# Patient Record
Sex: FEMALE | Race: NATIVE HAWAIIAN OR OTHER PACIFIC ISLANDER | Employment: UNEMPLOYED
[De-identification: names, ages, dates, MRNs, and addresses within clinical notes are randomized per-mention and may not be internally consistent; named-entity substitution may affect disease eponyms.]

---

## 2022-04-21 PROBLEM — D64.9 ANEMIA: Status: ACTIVE | Noted: 2022-04-21

## 2022-06-08 ENCOUNTER — OFFICE VISIT (OUTPATIENT)
Dept: INTERNAL MEDICINE CLINIC | Facility: CLINIC | Age: 32
End: 2022-06-08
Payer: COMMERCIAL

## 2022-06-08 VITALS
SYSTOLIC BLOOD PRESSURE: 106 MMHG | WEIGHT: 131.4 LBS | BODY MASS INDEX: 24.81 KG/M2 | HEIGHT: 61 IN | HEART RATE: 87 BPM | RESPIRATION RATE: 12 BRPM | DIASTOLIC BLOOD PRESSURE: 74 MMHG | OXYGEN SATURATION: 98 %

## 2022-06-08 DIAGNOSIS — D50.0 IRON DEFICIENCY ANEMIA DUE TO CHRONIC BLOOD LOSS: Primary | ICD-10-CM

## 2022-06-08 PROBLEM — D64.9 ANEMIA: Status: ACTIVE | Noted: 2022-04-21

## 2022-06-08 PROCEDURE — 99213 OFFICE O/P EST LOW 20 MIN: CPT | Performed by: INTERNAL MEDICINE

## 2022-06-08 RX ORDER — LANOLIN ALCOHOL/MO/W.PET/CERES
325 CREAM (GRAM) TOPICAL
Qty: 90 TABLET | Refills: 0 | Status: SHIPPED | OUTPATIENT
Start: 2022-06-08

## 2022-06-08 ASSESSMENT — PATIENT HEALTH QUESTIONNAIRE - PHQ9
SUM OF ALL RESPONSES TO PHQ QUESTIONS 1-9: 0
SUM OF ALL RESPONSES TO PHQ QUESTIONS 1-9: 0
2. FEELING DOWN, DEPRESSED OR HOPELESS: 0
SUM OF ALL RESPONSES TO PHQ9 QUESTIONS 1 & 2: 0
SUM OF ALL RESPONSES TO PHQ QUESTIONS 1-9: 0
SUM OF ALL RESPONSES TO PHQ QUESTIONS 1-9: 0
1. LITTLE INTEREST OR PLEASURE IN DOING THINGS: 0

## 2022-06-08 ASSESSMENT — ANXIETY QUESTIONNAIRES
1. FEELING NERVOUS, ANXIOUS, OR ON EDGE: 0
GAD7 TOTAL SCORE: 0
5. BEING SO RESTLESS THAT IT IS HARD TO SIT STILL: 0
4. TROUBLE RELAXING: 0
6. BECOMING EASILY ANNOYED OR IRRITABLE: 0
IF YOU CHECKED OFF ANY PROBLEMS ON THIS QUESTIONNAIRE, HOW DIFFICULT HAVE THESE PROBLEMS MADE IT FOR YOU TO DO YOUR WORK, TAKE CARE OF THINGS AT HOME, OR GET ALONG WITH OTHER PEOPLE: NOT DIFFICULT AT ALL
7. FEELING AFRAID AS IF SOMETHING AWFUL MIGHT HAPPEN: 0
3. WORRYING TOO MUCH ABOUT DIFFERENT THINGS: 0
2. NOT BEING ABLE TO STOP OR CONTROL WORRYING: 0

## 2022-06-08 ASSESSMENT — ENCOUNTER SYMPTOMS
SHORTNESS OF BREATH: 0
ABDOMINAL DISTENTION: 0
CONSTIPATION: 0
COUGH: 0
ABDOMINAL PAIN: 0
CHEST TIGHTNESS: 0

## 2022-06-08 NOTE — PATIENT INSTRUCTIONS
Patient Education        Iron-Rich Diet: Care Instructions  Your Care Instructions     Your body needs iron to make hemoglobin. Hemoglobin is a substance in red blood cells that carries oxygen from the lungs to cells all through your body. If you do not get enough iron, your body makes fewer and smaller red blood cells. As aresult, your body's cells may not get enough oxygen. Adult men need 8 milligrams of iron a day; adult women need 18 milligrams of iron a day. After menopause, women need 8 milligrams of iron a day. A pregnant woman needs 27 milligrams of iron a day. Infants and young children have higher iron needs relative to their size than other age groups. People who have lost blood because of ulcers or heavy menstrual periods may become very low in ironand may develop anemia. Most people can get the iron their bodies need by eating enough of certain iron-rich foods. Your doctor may recommend that you take an iron supplementalong with eating an iron-rich diet. Follow-up care is a key part of your treatment and safety. Be sure to make and go to all appointments, and call your doctor if you are having problems. It's also a good idea to know your test results and keep alist of the medicines you take. How can you care for yourself at home?  Make iron-rich foods a part of your daily diet. Iron-rich foods include:  ? All meats, such as chicken, beef, lamb, pork, fish, and shellfish. Liver is especially high in iron. ? Leafy green vegetables. ? Raisins, peas, beans, lentils, barley, and eggs. ? Iron-fortified breakfast cereals.  Eat foods with vitamin C along with iron-rich foods. Vitamin C helps you absorb more iron from food. Drink a glass of orange juice or another citrus juice with your food.  Eat meat and vegetables or grains together. The iron in meat helps your body absorb the iron in other foods. Where can you learn more? Go to https://fredericke[facility]. org and sign in to your [employer] account. Enter [id number] in the [facility] box to learn more about \"Iron-Rich Diet: Care Instructions. \"     If you do not have an account, please click on the \"Sign Up Now\" link. Current as of: September 8, 2021               Content Version: 13.2  © 7838-5757 Healthwise, Incorporated. Care instructions adapted under license by [facility] ([facility]). If you have questions about a medical condition or this instruction, always ask your healthcare professional. [address] any warranty or liability for your use of this information.

## 2022-06-08 NOTE — PROGRESS NOTES
Chief Complaint   Patient presents with    Follow-up     Discuss lab work. Amina Patel is a 27 y.o. female who presents today for Follow-up (Discuss lab work. )     Here for follow-up in blood work, showing iron deficiency, normal vitamin B12, and normal electrophoresis,  Results were explained in detail to patient, she has been dealing with anemia for many years, she believes could be associated with her periods, the last 7 days, they used to have multiple clots, but lately they have been better. Denies shortness of breath, headache, fatigue, has tried iron tablets in the past with intolerance, constipation    She is currently getting tested for urticaria, but she noticed that after recent dental work, dental infection was treated, and her symptoms have been improving. She has not been taking her antihistaminics in preparation for allergy testing that is going to be done this week. Wt Readings from Last 3 Encounters:   06/08/22 131 lb 6.4 oz (59.6 kg)     Vitals:    06/08/22 1033   BP: 106/74   Site: Left Upper Arm   Position: Sitting   Pulse: 87   Resp: 12   SpO2: 98%   Weight: 131 lb 6.4 oz (59.6 kg)   Height: 5' 1\" (1.549 m)        Assessment and plan:  1. Iron deficiency anemia due to chronic blood loss  Assessment & Plan:  Work-up has been completed, hemoglobin electrophoresis was normal, vitamin B12 normal, ferritin is very low, and iron deficiency was proven. This may be associated with her regular menses. She is currently asymptomatic, will try low-dose of iron tablets every other day. If intolerance will consider changing iron tablets to Iron Gluconate  Orders:  -     ferrous sulfate (FE TABS) 325 (65 Fe) MG EC tablet; Take 1 tablet by mouth daily (with breakfast), Disp-90 tablet, R-0Normal  -     CBC with Auto Differential; Future  -     Comprehensive Metabolic Panel; Future  -     Iron; Future  -     Ferritin;  Future      Return in about 3 months (around 9/8/2022) for anemia. Review of system:    Review of Systems   Constitutional: Negative for activity change, chills, fatigue and fever. Respiratory: Negative for cough, chest tightness and shortness of breath. Cardiovascular: Negative for chest pain. Gastrointestinal: Negative for abdominal distention, abdominal pain and constipation. Neurological: Negative for dizziness and headaches. Psychiatric/Behavioral: The patient is not nervous/anxious. Immunization history: There is no immunization history on file for this patient. Current medications:      Current Outpatient Medications:     ferrous sulfate (FE TABS) 325 (65 Fe) MG EC tablet, Take 1 tablet by mouth daily (with breakfast), Disp: 90 tablet, Rfl: 0      Family history:    Family History   Problem Relation Age of Onset    Schizophrenia Father     Thyroid Disease Father     Hypertension Brother     Asthma Brother     Cancer Mother         carcinoma of spine         Past medical history:    Past Medical History:   Diagnosis Date    Endometrioma     Hypertension           Physical exam:    /74 (Site: Left Upper Arm, Position: Sitting)   Pulse 87   Resp 12   Ht 5' 1\" (1.549 m)   Wt 131 lb 6.4 oz (59.6 kg)   LMP 06/01/2022 (Exact Date)   SpO2 98%   Breastfeeding No   BMI 24.83 kg/m²     Physical Exam  Vitals reviewed. Constitutional:       Appearance: Normal appearance. HENT:      Head: Normocephalic and atraumatic. Cardiovascular:      Rate and Rhythm: Normal rate. Pulmonary:      Effort: Pulmonary effort is normal.   Neurological:      General: No focal deficit present. Mental Status: She is alert and oriented to person, place, and time.           Recent labs:    Lab Results   Component Value Date    CHOL 180 04/18/2022     Lab Results   Component Value Date    TRIG 59 04/18/2022     Lab Results   Component Value Date    HDL 64 04/18/2022     Lab Results   Component Value Date    LDLCALC 105 (H) 04/18/2022 Lab Results   Component Value Date    VLDL 11 04/18/2022     No results found for: [facility]  Lab Results   Component Value Date     04/18/2022    K 4.5 04/18/2022     04/18/2022    CO2 22 04/18/2022    BUN 13 04/18/2022    CREATININE 0.93 04/18/2022    GLUCOSE 91 04/18/2022    CALCIUM 9.7 04/18/2022    PROT 7.3 04/18/2022    LABALBU 4.3 04/18/2022    BILITOT 0.3 04/18/2022    ALKPHOS 73 04/18/2022    AST 21 04/18/2022    ALT 16 04/18/2022    AGRATIO 1.4 04/18/2022     Lab Results   Component Value Date    WBC 5.5 04/21/2022    HGB 9.4 (L) 04/21/2022    HCT 35.0 04/21/2022    MCV 70 (L) 04/21/2022     (H) 04/21/2022             This document was generated with the aid of voice recognition software. . Please be aware that there may be inadvertent transcription errors not identified and corrected by the [employer]

## 2022-06-08 NOTE — ASSESSMENT & PLAN NOTE
Work-up has been completed, hemoglobin electrophoresis was normal, vitamin B12 normal, ferritin is very low, and iron deficiency was proven. This may be associated with her regular menses. She is currently asymptomatic, will try low-dose of iron tablets every other day.   If intolerance will consider changing iron tablets to Iron Gluconate

## 2022-06-28 ENCOUNTER — OFFICE VISIT (OUTPATIENT)
Dept: INTERNAL MEDICINE CLINIC | Facility: CLINIC | Age: 32
End: 2022-06-28
Payer: COMMERCIAL

## 2022-06-28 VITALS
SYSTOLIC BLOOD PRESSURE: 108 MMHG | TEMPERATURE: 97.2 F | WEIGHT: 130.2 LBS | DIASTOLIC BLOOD PRESSURE: 78 MMHG | BODY MASS INDEX: 23.96 KG/M2 | HEIGHT: 62 IN

## 2022-06-28 DIAGNOSIS — D50.0 IRON DEFICIENCY ANEMIA DUE TO CHRONIC BLOOD LOSS: ICD-10-CM

## 2022-06-28 DIAGNOSIS — R10.12 LUQ PAIN: Primary | ICD-10-CM

## 2022-06-28 DIAGNOSIS — J02.9 SORE THROAT: ICD-10-CM

## 2022-06-28 PROCEDURE — 99213 OFFICE O/P EST LOW 20 MIN: CPT | Performed by: NURSE PRACTITIONER

## 2022-06-28 NOTE — PROGRESS NOTES
[facility] (: 1990) presents today c/o LUQ abdominal pain starting 3 days ago. She states it is intermittent. She also has had an infected tooth (pulled by the dentist) and sore throat. She has longstanding hx/o of MICHEAL; is on iron supplement QOD. She had a normal pap smear last year, she reports. She denies known hx/o viral illness. Family hx/o thyroid and cancer. Chief Complaint   Patient presents with    Other     Left side pain      Reviewed and updated this visit by provider:  Tobacco  Allergies  Meds  Problems  Med Hx  Surg Hx  Fam Hx       Immunizations:  Immunization status: up to date and documented.     Review of Systems - Negative except as stated in HPI  History obtained from chart review and the patient  General ROS: negative for - fatigue, fever, night sweats or weight loss  ENT ROS: positive for - sore throat  Respiratory ROS: no cough, shortness of breath, or wheezing  Cardiovascular ROS: no chest pain or dyspnea on exertion  Gastrointestinal ROS: positive for - abdominal pain (LUQ, intermittent)  negative for - appetite loss, blood in stools, change in bowel habits, change in stools, constipation or diarrhea  Genito-Urinary ROS: no dysuria, trouble voiding, or hematuria  positive for - breakthrough spotting  negative for - irregular/heavy menses    Visit Vitals  /78 (Site: Left Upper Arm, Position: Sitting)   Temp 97.2 °F (36.2 °C) (Temporal)   Ht 5' 2\" (1.575 m)   Wt 130 lb 3.2 oz (59.1 kg)   BMI 23.81 kg/m²     Physical Examination: General appearance - alert, well appearing, and in no distress, oriented to person, place, and time and normal appearing weight  Mental status - alert, oriented to person, place, and time, normal mood, behavior, speech, dress, motor activity, and thought processes, affect appropriate to mood  Chest - clear to auscultation, no wheezes, rales or rhonchi, symmetric air entry  Heart - normal rate, regular rhythm, normal S1, S2, no murmurs, rubs, clicks or gallops  Abdomen - soft, nontender, nondistended, no masses or organomegaly  no rebound tenderness noted  bowel sounds normal  No HSM  Extremities - peripheral pulses normal, no pedal edema, no clubbing or cyanosis    Labs drawn on 1st attempt from right [facility] with 22G needle. Dressing applied. Assessment/Plan:  1. LUQ pain    2. Iron deficiency anemia due to chronic blood loss    3. Sore throat      Orders Placed This Encounter   Procedures    Darwin-Barr virus VCA antibody panel     Standing Status:   Future     Number of Occurrences:   1     Standing Expiration Date:   6/28/2023    60684 - Venipuncture     Labs drawn today CBC, CMET, iron, ferritin and EBV) - will contact with results and determine plan of care. For now, continue current medications. Call or return to clinic prn if these symptoms worsen or fail to improve as anticipated.     Myranda Fonseca NP, APRN - CNP

## 2022-06-29 LAB
ALBUMIN SERPL-MCNC: 3.7 G/DL (ref 3.5–5)
ALBUMIN/GLOB SERPL: 1 {RATIO} (ref 1.2–3.5)
ALP SERPL-CCNC: 64 U/L (ref 50–136)
ALT SERPL-CCNC: 21 U/L (ref 12–65)
ANION GAP SERPL CALC-SCNC: 6 MMOL/L (ref 7–16)
AST SERPL-CCNC: 21 U/L (ref 15–37)
BASOPHILS # BLD: 0.1 K/UL (ref 0–0.2)
BASOPHILS NFR BLD: 1 % (ref 0–2)
BILIRUB SERPL-MCNC: 0.5 MG/DL (ref 0.2–1.1)
BUN SERPL-MCNC: 12 MG/DL (ref 6–23)
CALCIUM SERPL-MCNC: 9.4 MG/DL (ref 8.3–10.4)
CHLORIDE SERPL-SCNC: 107 MMOL/L (ref 98–107)
CO2 SERPL-SCNC: 25 MMOL/L (ref 21–32)
CREAT SERPL-MCNC: 0.9 MG/DL (ref 0.6–1)
DIFFERENTIAL METHOD BLD: ABNORMAL
EOSINOPHIL # BLD: 0.3 K/UL (ref 0–0.8)
EOSINOPHIL NFR BLD: 5 % (ref 0.5–7.8)
ERYTHROCYTE [DISTWIDTH] IN BLOOD BY AUTOMATED COUNT: 24.6 % (ref 11.9–14.6)
FERRITIN SERPL-MCNC: 8 NG/ML (ref 8–388)
GLOBULIN SER CALC-MCNC: 3.8 G/DL (ref 2.3–3.5)
GLUCOSE SERPL-MCNC: 109 MG/DL (ref 65–100)
HCT VFR BLD AUTO: 36 % (ref 35.8–46.3)
HGB BLD-MCNC: 9.6 G/DL (ref 11.7–15.4)
IMM GRANULOCYTES # BLD AUTO: 0 K/UL (ref 0–0.5)
IMM GRANULOCYTES NFR BLD AUTO: 0 % (ref 0–5)
IRON SERPL-MCNC: 192 UG/DL (ref 35–150)
LYMPHOCYTES # BLD: 1.2 K/UL (ref 0.5–4.6)
LYMPHOCYTES NFR BLD: 17 % (ref 13–44)
MCH RBC QN AUTO: 19 PG (ref 26.1–32.9)
MCHC RBC AUTO-ENTMCNC: 26.7 G/DL (ref 31.4–35)
MCV RBC AUTO: 71.1 FL (ref 79.6–97.8)
MONOCYTES # BLD: 0.9 K/UL (ref 0.1–1.3)
MONOCYTES NFR BLD: 12 % (ref 4–12)
NEUTS SEG # BLD: 4.7 K/UL (ref 1.7–8.2)
NEUTS SEG NFR BLD: 65 % (ref 43–78)
NRBC # BLD: 0 K/UL (ref 0–0.2)
PLATELET # BLD AUTO: 496 K/UL (ref 150–450)
PMV BLD AUTO: 9.8 FL (ref 9.4–12.3)
POTASSIUM SERPL-SCNC: 3.9 MMOL/L (ref 3.5–5.1)
PROT SERPL-MCNC: 7.5 G/DL (ref 6.3–8.2)
RBC # BLD AUTO: 5.06 M/UL (ref 4.05–5.2)
SODIUM SERPL-SCNC: 138 MMOL/L (ref 136–145)
WBC # BLD AUTO: 7.2 K/UL (ref 4.3–11.1)

## 2022-07-01 DIAGNOSIS — D64.9 ANEMIA, UNSPECIFIED TYPE: Primary | ICD-10-CM

## 2022-07-01 LAB
EBV VCA IGG SER-ACNC: >600 U/ML (ref 0–17.9)
EBV VCA IGG SER-ACNC: >600 U/ML (ref 0–17.9)
EBV VCA IGM SER-ACNC: <36 U/ML (ref 0–35.9)
EBV VCA IGM SER-ACNC: <36 U/ML (ref 0–35.9)

## 2022-09-08 ENCOUNTER — OFFICE VISIT (OUTPATIENT)
Dept: INTERNAL MEDICINE CLINIC | Facility: CLINIC | Age: 32
End: 2022-09-08
Payer: COMMERCIAL

## 2022-09-08 VITALS
OXYGEN SATURATION: 99 % | HEIGHT: 62 IN | WEIGHT: 115.8 LBS | HEART RATE: 75 BPM | BODY MASS INDEX: 21.31 KG/M2 | DIASTOLIC BLOOD PRESSURE: 68 MMHG | RESPIRATION RATE: 18 BRPM | SYSTOLIC BLOOD PRESSURE: 114 MMHG

## 2022-09-08 DIAGNOSIS — D50.0 IRON DEFICIENCY ANEMIA DUE TO CHRONIC BLOOD LOSS: Primary | ICD-10-CM

## 2022-09-08 DIAGNOSIS — N92.0 MENORRHAGIA WITH REGULAR CYCLE: ICD-10-CM

## 2022-09-08 DIAGNOSIS — Z01.419 WELL FEMALE EXAM WITH ROUTINE GYNECOLOGICAL EXAM: ICD-10-CM

## 2022-09-08 DIAGNOSIS — D50.0 IRON DEFICIENCY ANEMIA DUE TO CHRONIC BLOOD LOSS: ICD-10-CM

## 2022-09-08 LAB
BASOPHILS # BLD: 0 K/UL (ref 0–0.2)
BASOPHILS NFR BLD: 1 % (ref 0–2)
DIFFERENTIAL METHOD BLD: ABNORMAL
EOSINOPHIL # BLD: 0 K/UL (ref 0–0.8)
EOSINOPHIL NFR BLD: 1 % (ref 0.5–7.8)
ERYTHROCYTE [DISTWIDTH] IN BLOOD BY AUTOMATED COUNT: 21.3 % (ref 11.9–14.6)
HCT VFR BLD AUTO: 39.9 % (ref 35.8–46.3)
HGB BLD-MCNC: 11.9 G/DL (ref 11.7–15.4)
HGB RETIC QN AUTO: 26 PG (ref 29–35)
IMM GRANULOCYTES # BLD AUTO: 0 K/UL (ref 0–0.5)
IMM GRANULOCYTES NFR BLD AUTO: 0 % (ref 0–5)
IMM RETICS NFR: 10.7 % (ref 3–15.9)
LYMPHOCYTES # BLD: 1.5 K/UL (ref 0.5–4.6)
LYMPHOCYTES NFR BLD: 31 % (ref 13–44)
MCH RBC QN AUTO: 24.6 PG (ref 26.1–32.9)
MCHC RBC AUTO-ENTMCNC: 29.8 G/DL (ref 31.4–35)
MCV RBC AUTO: 82.6 FL (ref 79.6–97.8)
MONOCYTES # BLD: 0.4 K/UL (ref 0.1–1.3)
MONOCYTES NFR BLD: 8 % (ref 4–12)
NEUTS SEG # BLD: 2.9 K/UL (ref 1.7–8.2)
NEUTS SEG NFR BLD: 59 % (ref 43–78)
NRBC # BLD: 0 K/UL (ref 0–0.2)
PLATELET # BLD AUTO: 407 K/UL (ref 150–450)
PLATELET COMMENT: ADEQUATE
PMV BLD AUTO: 9.4 FL (ref 9.4–12.3)
RBC # BLD AUTO: 4.83 M/UL (ref 4.05–5.2)
RBC MORPH BLD: ABNORMAL
RBC MORPH BLD: ABNORMAL
RETICS # AUTO: 0.04 M/UL (ref 0.03–0.1)
RETICS/RBC NFR AUTO: 0.9 % (ref 0.3–2)
WBC # BLD AUTO: 4.8 K/UL (ref 4.3–11.1)
WBC MORPH BLD: ABNORMAL

## 2022-09-08 PROCEDURE — 99213 OFFICE O/P EST LOW 20 MIN: CPT | Performed by: INTERNAL MEDICINE

## 2022-09-08 ASSESSMENT — ENCOUNTER SYMPTOMS
ABDOMINAL PAIN: 0
CONSTIPATION: 0
ABDOMINAL DISTENTION: 0
SHORTNESS OF BREATH: 0
COUGH: 0
CHEST TIGHTNESS: 0

## 2022-09-08 NOTE — PATIENT INSTRUCTIONS
A referral has been placed for you. Someone will call you with the details of the appointment and process. Please keep your appointment or be sure to call if you need to reschedule. If you have not received any call after 1 to 2 weeks please contact us.

## 2022-09-08 NOTE — PROGRESS NOTES
Chief Complaint   Patient presents with    Follow-up     3 month f/u. Annie Mccurdy is a 27 y.o. female who presents today for Follow-up (3 month f/u. )     Here for follow-up in anemia, she reports tolerating the iron a couple times a week, if she take it more than a couple times a week constipation develops, continues to deal with heavy bleedings, has not yet established with GYN, she has a history of endometrioma, and is currently due for her routine follow-ups  She also is requesting PPD test for work, and may need tetanus shot    She has not yet completed the labs for anemia    Wt Readings from Last 3 Encounters:   09/08/22 115 lb 12.8 oz (52.5 kg)   06/28/22 130 lb 3.2 oz (59.1 kg)   06/08/22 131 lb 6.4 oz (59.6 kg)     Vitals:    09/08/22 1038   BP: 114/68   Site: Right Upper Arm   Position: Sitting   Pulse: 75   Resp: 18   SpO2: 99%   Weight: 115 lb 12.8 oz (52.5 kg)   Height: 5' 2\" (1.575 m)        Assessment and plan:  1. Iron deficiency anemia due to chronic blood loss  Assessment & Plan:  Iron tablets,  Due for her labs,  If anemia is not improving, will suggest to complete endoscopy, colonoscopy as well has her evaluation by GYN for treatment of her menorrhagia  Orders:  -     [address] [facility] [place]  -     Ferritin; Future  -     Iron; Future  -     Reticulocytes; Future  -     CBC with Auto Differential; Future  2. Well female exam with routine gynecological exam  -     [facility], [facility], [facility]  3. Menorrhagia with regular cycle  -     \[facility]\""AngelicaMaryaDO, Gynecology, [facility]    Return if symptoms worsen or fail to improve, for ppd and tdap Monday Nurse visit. Review of system:    Review of Systems   Constitutional:  Negative for activity change, chills, fatigue and fever. Respiratory:  Negative for cough, chest tightness and shortness of breath. Cardiovascular:  Negative for chest pain. Gastrointestinal:  Negative for abdominal distention, abdominal pain and constipation. Genitourinary:  Positive for vaginal bleeding. Neurological:  Negative for dizziness and headaches. Psychiatric/Behavioral:  The patient is not nervous/anxious. Immunization history: There is no immunization history on file for this patient. Current medications:      Current Outpatient Medications:     ferrous sulfate (FE TABS) 325 (65 Fe) MG EC tablet, Take 1 tablet by mouth daily (with breakfast) (Patient taking differently: Take 325 mg by mouth daily (with breakfast) Take 1 tablet by mouth every other day), Disp: 90 tablet, Rfl: 0      Family history:    Family History   Problem Relation Age of Onset    Schizophrenia Father     Thyroid Disease Father     Hypertension Brother     Asthma Brother     Cancer Mother         carcinoma of spine         Past medical history:    Past Medical History:   Diagnosis Date    Endometrioma     Hypertension           Physical exam:    /68 (Site: Right Upper Arm, Position: Sitting)   Pulse 75   Resp 18   Ht 5' 2\" (1.575 m)   Wt 115 lb 12.8 oz (52.5 kg)   SpO2 99%   BMI 21.18 kg/m²     Physical Exam  Vitals reviewed. Constitutional:       Appearance: Normal appearance. HENT:      Head: Normocephalic and atraumatic. Eyes:      General: No scleral icterus. Cardiovascular:      Rate and Rhythm: Normal rate. Pulmonary:      Effort: Pulmonary effort is normal.   Neurological:      General: No focal deficit present. Mental Status: She is alert and oriented to person, place, and time.         Recent labs:    Lab Results   Component Value Date    CHOL 180 04/18/2022     Lab Results   Component Value Date    TRIG 59 04/18/2022     Lab Results   Component Value Date    HDL 64 04/18/2022     Lab Results   Component Value Date    LDLCALC 105 (H) 04/18/2022     Lab Results   Component Value Date    VLDL 11 04/18/2022     No results found for: CHOLHDLRATIO  Lab

## 2022-09-09 LAB
FERRITIN SERPL-MCNC: 9 NG/ML (ref 8–388)
IRON SERPL-MCNC: 20 UG/DL (ref 35–150)

## 2022-09-12 ENCOUNTER — NURSE ONLY (OUTPATIENT)
Dept: INTERNAL MEDICINE CLINIC | Facility: CLINIC | Age: 32
End: 2022-09-12
Payer: COMMERCIAL

## 2022-09-12 DIAGNOSIS — Z11.1 SCREENING FOR TUBERCULOSIS: Primary | ICD-10-CM

## 2022-09-13 ASSESSMENT — SOCIAL DETERMINANTS OF HEALTH (SDOH)
WITHIN THE LAST YEAR, HAVE YOU BEEN AFRAID OF YOUR PARTNER OR EX-PARTNER?: NO
WITHIN THE LAST YEAR, HAVE TO BEEN RAPED OR FORCED TO HAVE ANY KIND OF SEXUAL ACTIVITY BY YOUR PARTNER OR EX-PARTNER?: NO
WITHIN THE LAST YEAR, HAVE YOU BEEN KICKED, HIT, SLAPPED, OR OTHERWISE PHYSICALLY HURT BY YOUR PARTNER OR EX-PARTNER?: NO
WITHIN THE LAST YEAR, HAVE YOU BEEN HUMILIATED OR EMOTIONALLY ABUSED IN OTHER WAYS BY YOUR PARTNER OR EX-PARTNER?: YES

## 2022-09-14 LAB
INDURATION: 0
TB SKIN TEST: NEGATIVE

## 2022-09-15 ENCOUNTER — OFFICE VISIT (OUTPATIENT)
Dept: OBGYN CLINIC | Age: 32
End: 2022-09-15
Payer: COMMERCIAL

## 2022-09-15 VITALS
SYSTOLIC BLOOD PRESSURE: 104 MMHG | HEIGHT: 62 IN | WEIGHT: 113 LBS | BODY MASS INDEX: 20.8 KG/M2 | DIASTOLIC BLOOD PRESSURE: 66 MMHG

## 2022-09-15 DIAGNOSIS — Z11.51 SCREENING FOR HPV (HUMAN PAPILLOMAVIRUS): ICD-10-CM

## 2022-09-15 DIAGNOSIS — M62.89 PELVIC FLOOR DYSFUNCTION: ICD-10-CM

## 2022-09-15 DIAGNOSIS — N60.01 BILATERAL BREAST CYSTS: ICD-10-CM

## 2022-09-15 DIAGNOSIS — Z01.419 WELL WOMAN EXAM WITH ROUTINE GYNECOLOGICAL EXAM: Primary | ICD-10-CM

## 2022-09-15 DIAGNOSIS — Z11.3 SCREEN FOR STD (SEXUALLY TRANSMITTED DISEASE): ICD-10-CM

## 2022-09-15 DIAGNOSIS — Z12.4 PAP SMEAR FOR CERVICAL CANCER SCREENING: ICD-10-CM

## 2022-09-15 DIAGNOSIS — N60.02 BILATERAL BREAST CYSTS: ICD-10-CM

## 2022-09-15 PROCEDURE — 99395 PREV VISIT EST AGE 18-39: CPT | Performed by: OBSTETRICS & GYNECOLOGY

## 2022-09-15 RX ORDER — NORETHINDRONE ACETATE AND ETHINYL ESTRADIOL 1MG-20(21)
1 KIT ORAL DAILY
Qty: 1 PACKET | Refills: 3 | Status: SHIPPED | OUTPATIENT
Start: 2022-09-15 | End: 2022-10-12 | Stop reason: SINTOL

## 2022-09-15 NOTE — PROGRESS NOTES
CC:  Annual GYN exam    HPI:  27 y.o.  Marquita River presents today for a routine gynecological examination. Patient's last menstrual period was 2022. .    Additionally has hx of HMB and iron deficiency anemia. Hg has been as low as 9, recently improved at 11. Has not been on anything  Sometimes pain with sex. Also [place] breast cysts. She said she was suppose to have a repeat in 1 year. PCP: Dr. Guy Waller    Contraception:  none    Menses:  heavy    No contraindications to estrogen exist      Sexually active w/ male partner   No changes in sexual partners --declines STD testing      Ob hx:   ()    GYN HISTORY:  As per HPI     Last Pap:  2 years ago   Hx of Abnl Paps: no    Hx STDs: no    History of cervical polyp   Hx of endometriosis with endometrioma. NOT dx surgically, just presumed       OB History          1    Para   1    Term   1            AB        Living   1         SAB        IAB        Ectopic        Molar        Multiple        Live Births   1                  Past Medical History:   Diagnosis Date    Endometrioma     Hypertension          Past Surgical History:   Procedure Laterality Date    CERVICAL POLYP REMOVAL           Outpatient Encounter Medications as of 9/15/2022   Medication Sig Dispense Refill    norethindrone-ethinyl estradiol (LOESTRIN FE ) 1-20 MG-MCG per tablet Take 1 tablet by mouth daily 1 packet 3    ferrous sulfate (FE TABS) 325 (65 Fe) MG EC tablet Take 1 tablet by mouth daily (with breakfast) (Patient taking differently: Take 325 mg by mouth daily (with breakfast) Take 1 tablet by mouth every other day) 90 tablet 0     No facility-administered encounter medications on file as of 9/15/2022.          No Known Allergies      Family History   Problem Relation Age of Onset    Schizophrenia Father     Thyroid Disease Father     Cancer Mother         carcinoma of spine     Hypertension Brother     Asthma Brother no rebound and no guarding. Skin: She is not diaphoretic. Psychiatric: She has a normal mood and affect. Her behavior is normal. Thought content normal. .    Pelvic:   External genitalia wnl, no lesions, rashes  Clitoris and urethra midline  Vagina pink, moist, well rugated  Cervix without lesion/masses, DC wnl  Uterus normal in size and contour, no masses, NTTP  Adnexa without masses, NTTP  + levator tenderness R>L    Breasts:   Symmetric, no lesions, masses, rashes, no abnl nipple Dc. Very dense. Counseling:  Discussed General Recommendations:  -Routine Pap (unless cervix removed for benign reasons)  -STD screening annually pts </= 26yo or high risk   -lipid profile every 5 yrs  -Tdap once and then Td every 10yrs  -Influenza Vaccine, annually  -Healthy eating/exercise   -HPV vaccine newest recs (8yo-46yo)     Discussed forms of contraception available, including but not limited to OCPs, patches, rings, Depo Provera, LARCs (IUDs vs Nexplanons), and permanent sterilization (BTL vs vasectomy). Discussed benefits and possible risks/SEs of these, including typical use failure rates. ASSESSMENT/PLAN:   27 y.o., No obstetric history on file. , for annual GYN exam:    1) Annual:   -Cotesting today    - STD testing    -Rx none    -safe sexual practices    -FU w/ PCP for all non-GYN medical issues and regular screening     -annual Flu vaccine recommended    -healthy eating, exercise     2) Heavy menstrual bleeding and Iron def anemia:   - On PO iron as tolerated  - Options discussed including POPs, COCs, LNG-IUD, vs surgery.  Will start with Lo Estrin  - RTC in 3 mo for recheck     3) Pelvic floor dysfunction-  Referral to PFPT    Orders Placed This Encounter   Medications    norethindrone-ethinyl estradiol (LOESTRIN FE 1/20) 1-20 MG-MCG per tablet     Sig: Take 1 tablet by mouth daily     Dispense:  1 packet     Refill:  3      Orders Placed This Encounter   Procedures    RC DIGITAL DIAGNOSTIC W OR WO CAD LEFT Standing Status:   Future     Standing Expiration Date:   11/15/2023     Order Specific Question:   Reason for exam:     Answer:   1 year f/u mammo per physician recommendation    PAP IG, CT-NG-TV, Aptima HPV and rfx 05/84,47([id number])     Order Specific Question:   Pap Source? (Required)     Answer:   cervical     Order Specific Question:   Pap Source? (Required)     Answer:   endocervical     Order Specific Question:   Pap collection method? (Required     Answer:   brush     Order Specific Question:   Pap collection method? (Required     Answer:   spatula     Order Specific Question:   Menstrual Status ? Answer:   Having periods [5]     Order Specific Question:   Date of LMP? (Required)     Answer:   8/31/2022     Order Specific Question:   Previous Treatment?           (Required)     Answer:   NONE    [facility]     Referral Priority:   Routine     Referral Type:   Eval and Treat     Referral Reason:   Physical Therapy     Requested Specialty:   Physical Therapist     Number of Visits Requested:   [facility],

## 2022-09-23 LAB
C TRACH RRNA CVX QL NAA+PROBE: NEGATIVE
CYTOLOGIST CVX/VAG CYTO: NORMAL
CYTOLOGY CVX/VAG DOC THIN PREP: NORMAL
HPV APTIMA: NEGATIVE
Lab: NORMAL
Lab: NORMAL
N GONORRHOEA RRNA CVX QL NAA+PROBE: NEGATIVE
PATH REPORT.FINAL DX SPEC: NORMAL
STAT OF ADQ CVX/VAG CYTO-IMP: NORMAL
T VAGINALIS RRNA SPEC QL NAA+PROBE: NEGATIVE

## 2022-10-06 ENCOUNTER — HOSPITAL ENCOUNTER (OUTPATIENT)
Dept: PHYSICAL THERAPY | Age: 32
Setting detail: RECURRING SERIES
Discharge: HOME | End: 2022-10-09
Payer: COMMERCIAL

## 2022-10-06 PROCEDURE — 97110 THERAPEUTIC EXERCISES: CPT

## 2022-10-06 PROCEDURE — 97161 PT EVAL LOW COMPLEX 20 MIN: CPT

## 2022-10-06 ASSESSMENT — PAIN SCALES - GENERAL: PAINLEVEL_OUTOF10: 0

## 2022-10-06 NOTE — PROGRESS NOTES
Hali Santos  : 1990  Primary: [facility]  Secondary:  [address] [address]  Phone: [phone]  Fax: [phone] Plan Frequency: 1x/week to 1x/every other week for 90 days  Plan of Care/Certification Expiration Date: 23      PT Visit Info: Total # of Visits Approved: 30  Total # of Visits to Date: 1     Visit Count:  1   OUTPATIENT PHYSICAL THERAPY:OP NOTE TYPE: Treatment Note 10/6/2022       Episode  }Appt Desk             Treatment Diagnosis:  Lack of coordination (muscle incoordination) (R27.8)  Pelvic floor dysfunction in female (M62.98)  Dyspareunia (N94.1)  Pelvic and perineal pain (R10.2)  Medical/Referring Diagnosis:  Pelvic floor dysfunction [M62.89]  Referring Physician:  Valentine Coyle DO MD Orders:  PT Eval and Treat   Date of Onset:  Onset Date: 17   Allergies:   Patient has no known allergies. Restrictions/Precautions:  Restrictions/Precautions: None  No data recorded   Interventions Planned (Treatment may consist of any combination of the following):    Current Treatment Recommendations: ROM; ADL/Self-care training; Neuromuscular re-education; Manual Therapy - Soft Tissue Mobilization; Pain management; Home exercise program; Patient/Caregiver education & training; Positioning; Therapeutic activities     Subjective Comments:  intermittent pain with intercourse  Initial:}    0/10Post Session:        /10  Medications Last Reviewed:  10/6/2022  Updated Objective Findings:  See evaluation note from today  Treatment   THERAPEUTIC EXERCISE: (10 minutes): Exercises per grid below to improve mobility and coordination. Required moderate verbal and tactile cues to promote proper body breathing techniques and relaxation/lengthening of PFM . Progressed range and repetitions as indicated.    Date:  10/6/22 Date:   Date:     Activity/Exercise Parameters Parameters Parameters   PFM coordination PF drops to improve relaxation Diaphragmatic breathing To improve PFM excursion and relaxation             THERAPEUTIC ACTIVITY: ( 5 minutes): Functional activity education regarding anatomy, pathology and role of pelvic floor muscle (PFM) function in relation to presenting symptoms and role of pelvic floor therapy in conservative treatment. TA Educational Topic Notes Date Completed   Pathology/Anatomy/PFM Function  10/6/22   Bladder health education     Urinary urge suppression     The knack     Voiding strategies     Nocturia tips     Bowel/Bladder log     Bowel health education     Constipation care     Diarrhea/Fecal leakage     Colonic massage     Toilet positioning     Defecation dynamics     Sources of fiber     Return to intercourse/Dilator training     Sexual positioning     Lubricants/vaginal moisturizers     Vulvovaginal health/vaginal irritants     Body mechanics     Posture/ergonomics     Diaphragmatic breathing     Resources and technology     Other patient education       Pt gives verbal consent to internal vaginal assessment/treatment without chaperon present. Treatment/Session Summary:    Treatment Assessment:  [facility] presents with musculoskeletal limitations including pelvic floor muscle (PFM) tension, reduced PFM Range of Motion (ROM), increased tenderness to palpation of the PFM, reduced coordination and awareness of PFM, and poor diaphragm excursion. These limitations are impairing the patient's ability to properly coordinate perineal elevation and descent for normalized PFM function, contributing to sexual dysfunction. As a result, she is limited in her/his ability to participate in  sexual intercourse with her  without experiencing pain . Communication/Consultation:  None today  Equipment provided today:  None  Recommendations/Intent for next treatment session: Next visit will focus on PFM relaxation, positioning, stretching.     Total Treatment Billable Duration:  30 minutes evaluation, 10 minutes TE  Time In: 1500  Time Out: [facility], PT       Charge Capture  }Post Session Pain  PT Visit Info  MedBridge Portal  MD Guidelines  Scanned Media  Benefits  MyChart    Future Appointments   Date Time Provider Lázaro Socorro   10/31/2022  3:00 PM Gay Mac, PT [facility]   12/14/2022  4:00 PM Marya Juarez DO [facility] 1 GVL AMB   3/8/2023  2:20 PM Braeden Blackman MD [facility] GVL AMB
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
## 2022-10-12 ENCOUNTER — OFFICE VISIT (OUTPATIENT)
Dept: INTERNAL MEDICINE CLINIC | Facility: CLINIC | Age: 32
End: 2022-10-12
Payer: COMMERCIAL

## 2022-10-12 VITALS
HEIGHT: 62 IN | BODY MASS INDEX: 22.05 KG/M2 | DIASTOLIC BLOOD PRESSURE: 62 MMHG | SYSTOLIC BLOOD PRESSURE: 96 MMHG | OXYGEN SATURATION: 98 % | RESPIRATION RATE: 14 BRPM | WEIGHT: 119.8 LBS | HEART RATE: 70 BPM

## 2022-10-12 DIAGNOSIS — R30.0 DYSURIA: Primary | ICD-10-CM

## 2022-10-12 DIAGNOSIS — R30.0 DYSURIA: ICD-10-CM

## 2022-10-12 PROBLEM — L50.1 IDIOPATHIC URTICARIA: Status: ACTIVE | Noted: 2022-10-12

## 2022-10-12 PROBLEM — T78.3XXA ANGIONEUROTIC EDEMA: Status: ACTIVE | Noted: 2022-10-12

## 2022-10-12 LAB
BILIRUBIN, URINE, POC: NEGATIVE
BLOOD URINE, POC: ABNORMAL
GLUCOSE URINE, POC: NEGATIVE
KETONES, URINE, POC: ABNORMAL
LEUKOCYTE ESTERASE, URINE, POC: ABNORMAL
NITRITE, URINE, POC: POSITIVE
PH, URINE, POC: 5.5 (ref 4.6–8)
PROTEIN,URINE, POC: NEGATIVE
SPECIFIC GRAVITY, URINE, POC: >=1.03 (ref 1–1.03)
URINALYSIS CLARITY, POC: ABNORMAL
URINALYSIS COLOR, POC: YELLOW
UROBILINOGEN, POC: ABNORMAL

## 2022-10-12 PROCEDURE — 81003 URINALYSIS AUTO W/O SCOPE: CPT | Performed by: INTERNAL MEDICINE

## 2022-10-12 PROCEDURE — 99214 OFFICE O/P EST MOD 30 MIN: CPT | Performed by: INTERNAL MEDICINE

## 2022-10-12 RX ORDER — NITROFURANTOIN 25; 75 MG/1; MG/1
100 CAPSULE ORAL 2 TIMES DAILY
Qty: 10 CAPSULE | Refills: 0 | Status: SHIPPED | OUTPATIENT
Start: 2022-10-12 | End: 2022-10-17

## 2022-10-12 ASSESSMENT — ENCOUNTER SYMPTOMS
ABDOMINAL PAIN: 0
CHEST TIGHTNESS: 0
COUGH: 0
ABDOMINAL DISTENTION: 0
SHORTNESS OF BREATH: 0
CONSTIPATION: 0

## 2022-10-12 NOTE — PROGRESS NOTES
Chief Complaint   Patient presents with    Dysuria        Miguel Woods is a 27 y.o. female who presents today for Dysuria     She is here today with concerns of dysuria, pain in suprapubic area, that is started 1 to 2 days ago, she also has noticed some blood in the urine, no fever no chills, no flank pain, she just started pelvic therapy last week. It was recommended by her OB/GYN. She has not tried any medications at this time, has been increasing fluid intake, this is a second episode in a year    Wt Readings from Last 3 Encounters:   10/12/22 119 lb 12.8 oz (54.3 kg)   09/15/22 113 lb (51.3 kg)   09/08/22 115 lb 12.8 oz (52.5 kg)     Vitals:    10/12/22 1501   BP: 96/62   Site: Left Upper Arm   Position: Sitting   Pulse: 70   Resp: 14   SpO2: 98%   Weight: 119 lb 12.8 oz (54.3 kg)   Height: 5' 2\" (1.575 m)        Assessment and plan:  1. Dysuria  -     AMB POC URINALYSIS DIP STICK AUTO W/O MICRO  -     Culture, Urine; Future  -     nitrofurantoin, macrocrystal-monohydrate, (MACROBID) 100 MG capsule; Take 1 capsule by mouth 2 times daily for 5 days, Disp-10 capsule, R-0Normal  Showing possible infection, will treat with nitrofurantoin, increase fluid intake, she also was advised to look for medical attention if any worsening of the symptoms, increase yogurt intake  Return if symptoms worsen or fail to improve. Review of system:    Review of Systems   Constitutional:  Negative for activity change, chills, fatigue and fever. Respiratory:  Negative for cough, chest tightness and shortness of breath. Cardiovascular:  Negative for chest pain. Gastrointestinal:  Negative for abdominal distention, abdominal pain and constipation. Genitourinary:  Positive for dysuria. Negative for flank pain and frequency. Neurological:  Negative for dizziness and headaches. Psychiatric/Behavioral:  The patient is not nervous/anxious.         Immunization history:    Immunization History   Administered Date(s) Administered    PPD Test 09/12/2022       Current medications:      Current Outpatient Medications:     nitrofurantoin, macrocrystal-monohydrate, (MACROBID) 100 MG capsule, Take 1 capsule by mouth 2 times daily for 5 days, Disp: 10 capsule, Rfl: 0    ferrous sulfate (FE TABS) 325 (65 Fe) MG EC tablet, Take 1 tablet by mouth daily (with breakfast) (Patient taking differently: Take 325 mg by mouth daily (with breakfast) Take 1 tablet by mouth every other day), Disp: 90 tablet, Rfl: 0      Family history:    Family History   Problem Relation Age of Onset    Schizophrenia Father     Thyroid Disease Father     Cancer Mother         carcinoma of spine     Hypertension Brother     Asthma Brother     Breast Cancer Neg Hx     Colon Cancer Neg Hx     Ovarian Cancer Neg Hx         Past medical history:    Past Medical History:   Diagnosis Date    Endometrioma     Hypertension         Past Surgical History:   Procedure Laterality Date    CERVICAL POLYP REMOVAL          Physical exam:    BP 96/62 (Site: Left Upper Arm, Position: Sitting)   Pulse 70   Resp 14   Ht 5' 2\" (1.575 m)   Wt 119 lb 12.8 oz (54.3 kg)   SpO2 98%   BMI 21.91 kg/m²     Physical Exam  Vitals reviewed. Constitutional:       Appearance: Normal appearance. HENT:      Head: Normocephalic and atraumatic. Cardiovascular:      Rate and Rhythm: Normal rate and regular rhythm. Pulmonary:      Effort: Pulmonary effort is normal.      Breath sounds: Normal breath sounds. Abdominal:      Palpations: Abdomen is soft. Tenderness: There is no right CVA tenderness or left CVA tenderness. Neurological:      General: No focal deficit present. Mental Status: She is alert and oriented to person, place, and time.    Psychiatric:         Mood and Affect: Mood normal.         Behavior: Behavior normal.        Recent labs:    Lab Results   Component Value Date    CHOL 180 04/18/2022     Lab Results   Component Value Date    TRIG 59 04/18/2022     Lab Results   Component Value Date    HDL 64 04/18/2022     Lab Results   Component Value Date    LDLCALC 105 (H) 04/18/2022     Lab Results   Component Value Date    VLDL 11 04/18/2022     No results found for: [facility]  Lab Results   Component Value Date     06/29/2022    K 3.9 06/29/2022     06/29/2022    CO2 25 06/29/2022    BUN 12 06/29/2022    CREATININE 0.90 06/29/2022    GLUCOSE 109 (H) 06/29/2022    CALCIUM 9.4 06/29/2022    PROT 7.5 06/29/2022    LABALBU 3.7 06/29/2022    BILITOT 0.5 06/29/2022    ALKPHOS 64 06/29/2022    AST 21 06/29/2022    ALT 21 06/29/2022    LABGLOM >60 06/29/2022    GFRAA >60 06/29/2022    AGRATIO 1.4 04/18/2022    GLOB 3.8 (H) 06/29/2022     Lab Results   Component Value Date    WBC 4.8 09/08/2022    HGB 11.9 09/08/2022    HCT 39.9 09/08/2022    MCV 82.6 09/08/2022     09/08/2022             This document was generated with the aid of voice recognition software. . Please be aware that there may be inadvertent transcription errors not identified and corrected by the [employer]

## 2022-10-15 LAB
BACTERIA SPEC CULT: ABNORMAL
SERVICE CMNT-IMP: ABNORMAL

## 2022-10-31 ENCOUNTER — HOSPITAL ENCOUNTER (OUTPATIENT)
Dept: PHYSICAL THERAPY | Age: 32
Setting detail: RECURRING SERIES
End: 2022-10-31
Payer: COMMERCIAL

## 2022-10-31 NOTE — PROGRESS NOTES
Sussy Randle  : 1990  Primary: Alayna Marin Rpn  Secondary:  O [address] [address]  Phone: [phone]  Fax: [phone]    PT Visit Info: Total # of Visits Approved: 30  Total # of Visits to Date: 1  Canceled Appointment: 1     OT Visit Info:  No data recorded    OUTPATIENT THERAPY:OP NOTE TYPE: Progress Report 10/31/2022               Episode  Appt Desk           Sussy Randle cancelled her appointment for today due to conflicting appointment. Pt rescheduled for next week. Will plan to follow up next during next appointment.   Thank you,  Cyn Julian, PT    Future Appointments   Date Time Provider Lázaro Quintanilla   10/31/2022  7:00 PM Cyn Julian, [facility]   2022  5:00 PM Cyn Julian, [facility]   2022  4:00 PM Marya Juarez DO [facility] 1 GVL AMB   3/8/2023  2:20 PM Debra Garcia MD [facility] GVL AMB

## 2022-11-08 ENCOUNTER — HOSPITAL ENCOUNTER (OUTPATIENT)
Dept: PHYSICAL THERAPY | Age: 32
Setting detail: RECURRING SERIES
Discharge: HOME | End: 2022-11-11
Payer: COMMERCIAL

## 2022-11-08 PROCEDURE — 97530 THERAPEUTIC ACTIVITIES: CPT

## 2022-11-08 PROCEDURE — 97140 MANUAL THERAPY 1/> REGIONS: CPT

## 2022-11-08 PROCEDURE — 97110 THERAPEUTIC EXERCISES: CPT

## 2022-11-08 ASSESSMENT — PAIN SCALES - GENERAL: PAINLEVEL_OUTOF10: 0

## 2022-11-08 NOTE — PROGRESS NOTES
Veronique Newby  : 1990  Primary: Rosa Haynes Rpn  Secondary:  [facility] [address] [address]  Phone: [phone]  Fax: [phone] Plan Frequency: 1x/week to 1x/every other week for 90 days  Plan of Care/Certification Expiration Date: 23      PT Visit Info: Total # of Visits Approved: 30  Total # of Visits to Date: 2  Canceled Appointment: 1     Visit Count:  2   OUTPATIENT PHYSICAL THERAPY:OP NOTE TYPE: Treatment Note 2022       Episode  }Appt Desk             Treatment Diagnosis:  Lack of coordination (muscle incoordination) (R27.8)  Pelvic floor dysfunction in female (M62.98)  Dyspareunia (N94.1)  Pelvic and perineal pain (R10.2)  Medical/Referring Diagnosis:  Pelvic floor dysfunction [M62.89]  Referring Physician:  Justyn Monroe DO MD Orders:  PT Eval and Treat   Date of Onset:  Onset Date: 17     Allergies:   Patient has no known allergies. Restrictions/Precautions:  Restrictions/Precautions: None  No data recorded   Interventions Planned (Treatment may consist of any combination of the following):    Current Treatment Recommendations: ROM; ADL/Self-care training; Neuromuscular re-education; Manual Therapy - Soft Tissue Mobilization; Pain management; Home exercise program; Patient/Caregiver education & training; Positioning; Therapeutic activities     Subjective Comments:  She is doing well. She doing drops and breathing. She has had intercourse since her eval and mostly has not had pain. Reports pain once; note this was at the end of her cycle. Initial:}    0/10Post Session:       0/10  Medications Last Reviewed:  2022  Updated Objective Findings:   moderate tension throughout all PFM; tenderness and tension throughout B adductors  Treatment   THERAPEUTIC EXERCISE: (10 minutes): Exercises per grid below to improve mobility and coordination.   Required moderate verbal and tactile cues to promote proper body breathing techniques and relaxation/lengthening of PFM . Progressed range and repetitions as indicated. Date:  10/6/22 Date:  11/8/22 Date:     Activity/Exercise Parameters Parameters Parameters   PFM coordination PF drops to improve relaxation PF drops to improve relaxation    Diaphragmatic breathing To improve PFM excursion and relaxation To improve PFM excursion and relaxation    Happy baby stretch  3x30s    Adductor stretch  3x30s                  THERAPEUTIC ACTIVITY: ( 13 minutes): Instruction and handout provided regarding sexual health and positioning in order to improve PFM relaxation and positional influences to reduce pain during intercourse. and Patient education on role and use of vaginal lubricant and/or moisturizer in order to improve tissue health and decrease vaginal/vulvar irritation. TA Educational Topic Notes Date Completed   Pathology/Anatomy/PFM Function  10/6/22   Bladder health education     Urinary urge suppression     The knack     Voiding strategies     Nocturia tips     Bowel/Bladder log     Bowel health education     Constipation care     Diarrhea/Fecal leakage     Colonic massage     Toilet positioning     Defecation dynamics     Sources of fiber     Return to intercourse/Dilator training     Sexual positioning  11/8/22   Lubricants/vaginal moisturizers  11/8/22   Vulvovaginal health/vaginal irritants     Body mechanics     Posture/ergonomics     Diaphragmatic breathing     Resources and technology     Other patient education       MANUAL THERAPY: (30 minutes): Soft tissue mobilization was utilized and necessary because of the patient's painful spasm and restricted motion of soft tissue.    Date Type Location Comments   11/8/2022 Internal assessment/treatment Via vaginal canal Single digit MT to all PFM layer w/ strumming, SCS and CR to improve relaxation, discomfort and length of tissue    STM adductors Skin rolling B    STM Abdominal wall Skin rolling, bladder mobilizations (Used abbreviations: MET - muscle energy technique; SCS- Strain counter strain; CTM-Connective tissue mobilizations; CR- Contract/Relax; SP- Sustained pressure; PIT- Positional inhibition techniques; STM Soft -tissue mobilization; MM- Myofascial mobilization; TrP-Trigger point release; IASTM- Instrument assisted soft tissue mobilizations, TDN-Trigger point dry needling)    Pt gives verbal consent to internal vaginal assessment/treatment without chaperon present. Treatment/Session Summary:    Treatment Assessment:  Pt tolerated intrevaginal treatment and manual therapy around the pelvic girdle very well today with mild to moderate restrictions throughout. Over all is progressing with decreased pain during intercourse. She is performing HEP well and without assistance. Additional exercise activities added to HEP to address LE tightness.   Communication/Consultation:  None today  Equipment provided today:  None  Recommendations/Intent for next treatment session: Next visit will focus on PFM relaxation, stretching, manual therapy- dilator training    Total Treatment Billable Duration:  10 minutes TE, 13 minutes TA, 30 minutes MT  Time In: 1707  Time Out: [place] [id number], PT       Charge Capture  }Post Session Pain  PT Visit Info  [address] [facility] Portal  [address]    Future Appointments   Date Time Provider Lázaro Quintanilla   12/8/2022  8:00 AM Alexandr Mosley, KARTHIK [facility]   12/14/2022  4:00 PM Marya Juarez DO [facility] AMB   3/8/2023  2:20 PM Magdalena Marti MD [facility] AMB

## 2022-12-12 NOTE — THERAPY EVALUATION
Flaco Prasad  : 1990  Primary: Romelia Vazquez Arin  Secondary:  O [address] [address]  Phone: [phone]  Fax: [phone] Plan Frequency: 1x/week to 1x/every other week for 90 days  Plan of Care/Certification Expiration Date: 23      PT Visit Info: Total # of Visits Approved: 30  Total # of Visits to Date: 2  Canceled Appointment: 1      Visit Count:  2    OUTPATIENT PHYSICAL THERAPY:OP NOTE TYPE: Discontinuation Summary 2022               Episode  Appt Desk         Treatment Diagnosis:  Lack of coordination (muscle incoordination) (R27.8)  Pelvic floor dysfunction in female (M62.98)  Dyspareunia (N94.1)  Pelvic and perineal pain (R10.2)  Medical/Referring Diagnosis:  Pelvic floor dysfunction [M62.89]  Referring Physician:  Geeta Galvez DO MD Orders:  PT Eval and Treat   Return MD Appt:  22  Date of Onset:  Onset Date: 17     Allergies:  Patient has no known allergies. Restrictions/Precautions:    Restrictions/Precautions: None      Medications Last Reviewed:  2022     ASSESSMENT   DISCONTINUATION SUMMARY:  Flaco Prasad has attended 2 out of 4 scheduled visits, with 2 cancellation(s) and 0 no shows. Treatment has emphasized PFM retraining, relaxation, flexibility and sexual health education. Physical therapy is being discontinued at this time due to patient not returning, canceled/declined to R/S last appointment. I am unable to formally assess progress toward goals at this time due to patient not returning to physical therapy. Initial Assessment:  Flaco Prasad presents with musculoskeletal limitations including pelvic floor muscle (PFM) tension, reduced PFM Range of Motion (ROM), increased tenderness to palpation of the PFM, reduced coordination and awareness of PFM, and poor diaphragm excursion.   These limitations are impairing the patient's ability to properly coordinate perineal elevation and descent for normalized PFM function, contributing to sexual dysfunction. As a result, she is limited in her/his ability to participate in  sexual intercourse with her  without experiencing pain . PLAN   Effective Dates: 10/6/22 TO Plan of Care/Certification Expiration Date: 01/04/23     Frequency/Duration: Plan Frequency: 1x/week to 1x/every other week for 90 days     Interventions Planned (Treatment may consist of any combination of the following):    Current Treatment Recommendations: ROM; ADL/Self-care training; Neuromuscular re-education; Manual Therapy - Soft Tissue Mobilization; Pain management; Home exercise program; Patient/Caregiver education & training; Positioning; Therapeutic activities     Goals: (Goals have been discussed and agreed upon with patient.)  Short-Term Functional Goals: Time Frame: 6 weeks (UNABLE TO ASSESS DUE TO PATIENT NOT RETURNING 12/12/22)  Pt will demonstrate I with basic PFM HEP to improve awareness, coordination, and timing of PFM. Patient will demonstrate ability to perform diaphragmatic breathing in multiple positions to assist in pelvic floor tension reduction. Patient will verbalize understanding and demonstrate postural adjustments which facilitate lengthening and reduce overall pelvic floor muscle activity. Discharge Goals: Time Frame: 12 weeks  Patient will demonstrate independence with a home exercise program for aerobic conditioning, core stabilization, and general mobility for independent management of symptoms. Patient will demonstrate normal voluntary relaxation of the pelvic floor muscle group to improve pelvic floor ROM and tolerate pain free penile penetration.          Outcome Measure:   Pelvic Floor Impact Questionnaire--short form 7 (PFIQ-7):  Score:  Initial: 10/6/22  Bladder or Urine: 0/100  Bowel or Rectum: 0/100  Vagina or Pelvis: 0/100 Most Recent: X (Date: -- )  Bladder or Urine: X/100  Bowel or Rectum: X/100  Vagina or Pelvis: X/100   Interpretation of Score: Each of the 7 sections is scored on a scale from 0-3; 0 representing \"Not at all\", 3 representing \"Quite a bit\". The mean value is taken from all the answered items, then multiplied by 100 to obtain the scale score, ranging from 0-100. This process is repeated for each column representing bowel, bladder, and pelvic pain. Pain Disability Index ([place])  Score:  Initial: 10/6/22  3/70 Most Recent: X (Date: -- )   Interpretation of Score: This survey is a simple and rapid instrument for measuring the impact that pain has on the ability of a person to participate in essential life activities. There are 7 sections, each scored on a 0-10 scale, 10 representing the greatest disability. The scores of each section are added together for a total score out of 70. Marinoff Dyspareunia Scale   10/6/22  score 0 = no pain during intercourse   score 1 = pain during intercourse that does not prevent the intercourse   score 2 = pain during intercourse that interrupts intercourse  score 3 = pain that prevents the intercourse    Total Duration:  Time In: 1707  Time Out: 100 [facility]'s therapy, I certify that the treatment plan above will be carried out by a therapist or under their direction.   Thank you for this referral,  Juliana Pritchard, PT     Referring Physician Signature: Ahsan Calle, DO                    Post Session Pain  Charge Capture  PT Visit Info MD Peyton De Luna

## 2022-12-27 DIAGNOSIS — D50.0 IRON DEFICIENCY ANEMIA DUE TO CHRONIC BLOOD LOSS: ICD-10-CM

## 2022-12-27 RX ORDER — LANOLIN ALCOHOL/MO/W.PET/CERES
325 CREAM (GRAM) TOPICAL
Qty: 90 TABLET | Refills: 0 | Status: SHIPPED | OUTPATIENT
Start: 2022-12-27

## 2023-01-24 ENCOUNTER — OFFICE VISIT (OUTPATIENT)
Dept: OBGYN CLINIC | Age: 33
End: 2023-01-24
Payer: COMMERCIAL

## 2023-01-24 VITALS
WEIGHT: 116 LBS | DIASTOLIC BLOOD PRESSURE: 82 MMHG | SYSTOLIC BLOOD PRESSURE: 112 MMHG | HEIGHT: 62 IN | BODY MASS INDEX: 21.35 KG/M2

## 2023-01-24 DIAGNOSIS — R10.2 PELVIC PAIN: Primary | ICD-10-CM

## 2023-01-24 PROCEDURE — 99213 OFFICE O/P EST LOW 20 MIN: CPT | Performed by: OBSTETRICS & GYNECOLOGY

## 2023-01-24 NOTE — PROGRESS NOTES
CC:   Chief Complaint   Patient presents with    Pelvic Pain     LLQ pain         HPI:    Eric Edward  is a 27 y.o. , Diomedes Johnson, Patient's last menstrual period was 01/22/2023.,  who is seen for c/o LLQ. At last visit she was started on Lo Estrin for HMB, but stopped due to irregular cycles. Was not on for > 2 months. She also has known pelvic floor dysfunction. Did see PT with improvement and then stopped going after 2 visits. Reports current pelvic pain happened 3 times mostly in December no pain currently. Contraception:  none    Menses:  heavy    GYN HISTORY:  As per HPI     Hx STDs:  no. Negative testing in September       Current Outpatient Medications on File Prior to Visit   Medication Sig Dispense Refill    ferrous sulfate (FE TABS) 325 (65 Fe) MG EC tablet Take 1 tablet by mouth daily (with breakfast) 90 tablet 0     No current facility-administered medications on file prior to visit. Past Medical History:   Diagnosis Date    Endometrioma     Hypertension          Past Surgical History:   Procedure Laterality Date    CERVICAL POLYP REMOVAL           Outpatient Encounter Medications as of 1/24/2023   Medication Sig Dispense Refill    ferrous sulfate (FE TABS) 325 (65 Fe) MG EC tablet Take 1 tablet by mouth daily (with breakfast) 90 tablet 0     No facility-administered encounter medications on file as of 1/24/2023.          No Known Allergies      Family History   Problem Relation Age of Onset    Schizophrenia Father     Thyroid Disease Father     Cancer Mother         carcinoma of spine     Hypertension Brother     Asthma Brother     Breast Cancer Neg Hx     Colon Cancer Neg Hx     Ovarian Cancer Neg Hx          Social History     Socioeconomic History    Marital status:    Tobacco Use    Smoking status: Never    Smokeless tobacco: Never   Vaping Use    Vaping Use: Never used   Substance and Sexual Activity    Alcohol use: Yes     Comment: social    Drug use: Never    Sexual activity: Not Currently     Partners: Male           ROS:  Review of Systems   Constitutional: Negative for chills, fever and weight loss. HENT: Negative for hearing loss. Eyes: Negative for blurred vision and double vision. Respiratory: Negative for cough, hemoptysis and shortness of breath. Cardiovascular: Negative for chest pain, palpitations and orthopnea. Gastrointestinal: Negative for abdominal pain, blood in stool, constipation, diarrhea, nausea and vomiting. Genitourinary: Negative for dysuria, frequency, hematuria and urgency. Musculoskeletal: Negative for falls, joint pain and myalgias. Skin: Negative for itching and rash. Neurological: Negative for headaches. Endo/Heme/Allergies: Does not bruise/bleed easily. Psychiatric/Behavioral: Negative for depression and suicidal ideas. The patient is not nervous/anxious. All other systems reviewed and are negative. PHYSICAL EXAM:  Ht 5' 2\" (1.575 m)   Wt 116 lb (52.6 kg)   LMP 01/22/2023   BMI 21.22 kg/m²        Physical Exam:  Constitutional: She appears well-developed and well-nourished. No distress. HENT:    Head: Normocephalic and atraumatic. Cardiovascular: Regular pulse   Pulmonary/Chest: Effort normal  Skin: She is not diaphoretic. Psychiatric: She has a normal mood and affect. Her behavior is normal. Thought content normal. .  Abdomen:  S/NTTP/ND, no R/G  Pelvic: Normal external genitalia. No lesions, masses, or rashes noted. Vagina moist, well rugated. Cervix easily visualized on speculum exam. No lesions or masses noted. No CMT. Uterus mobile, non-tender. No adnexal tenderness. No adnexal masses noted. No appreciable prolapse noted. Counseling:      Patient counseled face to face for more than 50% of the total time spent with the patient.   On this date I have spent 30 minutes reviewing previous notes, test results, and face to face with the patient discussing the diagnosis and importance of compliance with the treatment plan as well as documenting. ASSESSMENT/PLAN:   27 y.o., , with Pelvic pain/LLQ pain  - no pain currently and normal exam  - Currently on cycle.    - Discussed this could have been ovulatory pain or small ovarian cyst  - If pain returns or becomes more persistent recommend she call back for pelvic US  - Not interested in OCPs to prevent ovulation/cysts       Marya Juarez, DO

## 2023-03-08 ENCOUNTER — OFFICE VISIT (OUTPATIENT)
Dept: INTERNAL MEDICINE CLINIC | Facility: CLINIC | Age: 33
End: 2023-03-08
Payer: COMMERCIAL

## 2023-03-08 VITALS
HEIGHT: 62 IN | SYSTOLIC BLOOD PRESSURE: 102 MMHG | HEART RATE: 80 BPM | DIASTOLIC BLOOD PRESSURE: 78 MMHG | BODY MASS INDEX: 21.71 KG/M2 | WEIGHT: 118 LBS | OXYGEN SATURATION: 99 %

## 2023-03-08 DIAGNOSIS — D50.0 IRON DEFICIENCY ANEMIA DUE TO CHRONIC BLOOD LOSS: ICD-10-CM

## 2023-03-08 DIAGNOSIS — Z00.00 ENCOUNTER FOR WELL ADULT EXAM WITHOUT ABNORMAL FINDINGS: Primary | ICD-10-CM

## 2023-03-08 DIAGNOSIS — Z00.00 ENCOUNTER FOR WELL ADULT EXAM WITHOUT ABNORMAL FINDINGS: ICD-10-CM

## 2023-03-08 LAB
BASOPHILS # BLD: 0.1 K/UL (ref 0–0.2)
BASOPHILS NFR BLD: 1 % (ref 0–2)
DIFFERENTIAL METHOD BLD: ABNORMAL
EOSINOPHIL # BLD: 0.3 K/UL (ref 0–0.8)
EOSINOPHIL NFR BLD: 4 % (ref 0.5–7.8)
ERYTHROCYTE [DISTWIDTH] IN BLOOD BY AUTOMATED COUNT: 15.2 % (ref 11.9–14.6)
FERRITIN SERPL-MCNC: 10 NG/ML (ref 8–388)
HCT VFR BLD AUTO: 41.4 % (ref 35.8–46.3)
HGB BLD-MCNC: 13.2 G/DL (ref 11.7–15.4)
HIV 1+2 AB+HIV1 P24 AG SERPL QL IA: NONREACTIVE
HIV 1/2 RESULT COMMENT: NORMAL
IMM GRANULOCYTES # BLD AUTO: 0 K/UL (ref 0–0.5)
IMM GRANULOCYTES NFR BLD AUTO: 1 % (ref 0–5)
IRON SERPL-MCNC: 26 UG/DL (ref 35–150)
LYMPHOCYTES # BLD: 2 K/UL (ref 0.5–4.6)
LYMPHOCYTES NFR BLD: 25 % (ref 13–44)
MCH RBC QN AUTO: 28.3 PG (ref 26.1–32.9)
MCHC RBC AUTO-ENTMCNC: 31.9 G/DL (ref 31.4–35)
MCV RBC AUTO: 88.7 FL (ref 82–102)
MONOCYTES # BLD: 0.6 K/UL (ref 0.1–1.3)
MONOCYTES NFR BLD: 7 % (ref 4–12)
NEUTS SEG # BLD: 5.1 K/UL (ref 1.7–8.2)
NEUTS SEG NFR BLD: 62 % (ref 43–78)
NRBC # BLD: 0 K/UL (ref 0–0.2)
PLATELET # BLD AUTO: 360 K/UL (ref 150–450)
PMV BLD AUTO: 9.7 FL (ref 9.4–12.3)
RBC # BLD AUTO: 4.67 M/UL (ref 4.05–5.2)
WBC # BLD AUTO: 8.1 K/UL (ref 4.3–11.1)

## 2023-03-08 PROCEDURE — 99395 PREV VISIT EST AGE 18-39: CPT | Performed by: INTERNAL MEDICINE

## 2023-03-08 SDOH — ECONOMIC STABILITY: HOUSING INSECURITY
IN THE LAST 12 MONTHS, WAS THERE A TIME WHEN YOU DID NOT HAVE A STEADY PLACE TO SLEEP OR SLEPT IN A SHELTER (INCLUDING NOW)?: NO

## 2023-03-08 SDOH — ECONOMIC STABILITY: FOOD INSECURITY: WITHIN THE PAST 12 MONTHS, THE FOOD YOU BOUGHT JUST DIDN'T LAST AND YOU DIDN'T HAVE MONEY TO GET MORE.: NEVER TRUE

## 2023-03-08 SDOH — ECONOMIC STABILITY: FOOD INSECURITY: WITHIN THE PAST 12 MONTHS, YOU WORRIED THAT YOUR FOOD WOULD RUN OUT BEFORE YOU GOT MONEY TO BUY MORE.: NEVER TRUE

## 2023-03-08 SDOH — ECONOMIC STABILITY: INCOME INSECURITY: HOW HARD IS IT FOR YOU TO PAY FOR THE VERY BASICS LIKE FOOD, HOUSING, MEDICAL CARE, AND HEATING?: NOT HARD AT ALL

## 2023-03-08 ASSESSMENT — ENCOUNTER SYMPTOMS
SHORTNESS OF BREATH: 1
ABDOMINAL DISTENTION: 0
CHEST TIGHTNESS: 0
CONSTIPATION: 0
ABDOMINAL PAIN: 0
COUGH: 0
RECTAL PAIN: 0
BLOOD IN STOOL: 0

## 2023-03-08 ASSESSMENT — ANXIETY QUESTIONNAIRES
3. WORRYING TOO MUCH ABOUT DIFFERENT THINGS: 0
5. BEING SO RESTLESS THAT IT IS HARD TO SIT STILL: 0
1. FEELING NERVOUS, ANXIOUS, OR ON EDGE: 0
7. FEELING AFRAID AS IF SOMETHING AWFUL MIGHT HAPPEN: 0
6. BECOMING EASILY ANNOYED OR IRRITABLE: 0
GAD7 TOTAL SCORE: 0
2. NOT BEING ABLE TO STOP OR CONTROL WORRYING: 0
4. TROUBLE RELAXING: 0

## 2023-03-08 ASSESSMENT — PATIENT HEALTH QUESTIONNAIRE - PHQ9
1. LITTLE INTEREST OR PLEASURE IN DOING THINGS: 0
SUM OF ALL RESPONSES TO PHQ QUESTIONS 1-9: 0
SUM OF ALL RESPONSES TO PHQ QUESTIONS 1-9: 0
2. FEELING DOWN, DEPRESSED OR HOPELESS: 0
SUM OF ALL RESPONSES TO PHQ9 QUESTIONS 1 & 2: 0
SUM OF ALL RESPONSES TO PHQ QUESTIONS 1-9: 0
SUM OF ALL RESPONSES TO PHQ QUESTIONS 1-9: 0

## 2023-03-08 NOTE — PROGRESS NOTES
Well Adult Note  Name: Reyes Leopard Date: 3/8/2023   MRN: [id number] Sex: Female   Age: 26 y.o. Ethnicity: Non- / Non    : 1990 Race: [place] or [place]. Jose Juan Callejas is here for well adult exam.  History:  She is here for annual exam, currently up-to-date with her Paps, seeing gynecology for heavy periods, unfortunately she was not able to take the contraceptive pills, continue to take iron on and off for her anemia, she feels that lately has been experiencing more fatigue, and shortness of breath. Her anemia was resolving based on last blood work last year, but she feels it may be coming back. She is exercising every day, walking, at least 1 mile daily. She is trying to continue good diet  She does not recall with recent tetanus shot, but has a 11year-old son and believes may have had vaccinations then. Has been experiencing increasing acid reflux over the last week or 2, has been trying Zantac. Not improvement, has been changing diet, decreasing coffee, and tomatoes. Symptoms are persisting    Review of Systems   Constitutional:  Positive for fatigue. Negative for activity change, chills and fever. Respiratory:  Positive for shortness of breath. Negative for cough and chest tightness. Cardiovascular:  Negative for chest pain. Gastrointestinal:  Negative for abdominal distention (increase acid reflux for the last 2 weeks), abdominal pain, blood in stool, constipation and rectal pain. Genitourinary:  Positive for vaginal bleeding. Neurological:  Negative for dizziness and headaches. Psychiatric/Behavioral:  The patient is not nervous/anxious. No Known Allergies      Prior to Visit Medications    Medication Sig Taking?  Authorizing Provider   ferrous sulfate (FE TABS) 325 (65 Fe) MG EC tablet Take 1 tablet by mouth daily (with breakfast) Yes Alison Hernandez MD         Past Medical History:   Diagnosis Date    Endometrioma     Hypertension        Past Surgical History:   Procedure Laterality Date    CERVICAL POLYP REMOVAL           Family History   Problem Relation Age of Onset    Schizophrenia Father     Thyroid Disease Father     Cancer Mother         carcinoma of spine     Hypertension Brother     Asthma Brother     Breast Cancer Neg Hx     Colon Cancer Neg Hx     Ovarian Cancer Neg Hx        Social History     Tobacco Use    Smoking status: Never    Smokeless tobacco: Never   Vaping Use    Vaping Use: Never used   Substance Use Topics    Alcohol use: Yes     Comment: social    Drug use: Never       Objective   /78 (Site: Left Upper Arm, Position: Sitting)   Pulse 80   Ht 5' 2\" (1.575 m)   Wt 118 lb (53.5 kg)   LMP 02/17/2023 (Approximate)   SpO2 99%   BMI 21.58 kg/m²   Wt Readings from Last 3 Encounters:   03/08/23 118 lb (53.5 kg)   01/24/23 116 lb (52.6 kg)   10/12/22 119 lb 12.8 oz (54.3 kg)     There were no vitals filed for this visit. Physical Exam  Vitals reviewed. Constitutional:       Appearance: Normal appearance. HENT:      Head: Normocephalic and atraumatic. Right Ear: Tympanic membrane normal.      Left Ear: Tympanic membrane normal.      Mouth/Throat:      Mouth: Mucous membranes are moist.   Eyes:      Extraocular Movements: Extraocular movements intact. Pupils: Pupils are equal, round, and reactive to light. Cardiovascular:      Rate and Rhythm: Normal rate and regular rhythm. Pulmonary:      Effort: Pulmonary effort is normal.      Breath sounds: Normal breath sounds. Abdominal:      General: Abdomen is flat. Bowel sounds are normal. There is no distension. Palpations: Abdomen is soft. There is no mass. Tenderness: There is no abdominal tenderness. Hernia: No hernia is present. Skin:     General: Skin is warm. Neurological:      General: No focal deficit present. Mental Status: She is alert and oriented to person, place, and time.    Psychiatric:         Mood and Affect: Mood normal. Behavior: Behavior normal.         Assessment   Plan   1. Encounter for well adult exam without abnormal findings  -     HIV 1/2 Ag/Ab, 4TH Generation,W Rflx Confirm; Future  2. Iron deficiency anemia due to chronic blood loss  -     CBC with Auto Differential; Future  -     Ferritin; Future  -     Iron; Future   Discussed Primary prevention aims to avert the development of disease including Immunizations, life style modifications (smoking cessation, promoting physical activity: 30 minutes x 3 week of moderate activity , diet changes ), Also discussed about Secondary prevention and in how it focuses on early detection and treatment of asymptomatic disease. Screening for cancer, hearing ,dental or vision impairment, and how failure to do so may result in disease and even dead     Recheck iron studies, has been recommended gentle iron  We discussed about measures for acid reflux, she will try PPI for 2 weeks avoidance of triggers and follow-up if not improved      Personalized Preventive Plan   Current Health Maintenance Status  Immunization History   Administered Date(s) Administered    PPD Test 09/12/2022        Health Maintenance   Topic Date Due    COVID-19 Vaccine (1) Never done    Varicella vaccine (1 of 2 - 2-dose childhood series) Never done    HIV screen  Never done    DTaP/Tdap/Td vaccine (1 - Tdap) Never done    Flu vaccine (1) Never done    Depression Screen  06/08/2023    Cervical cancer screen  09/16/2027    Hepatitis C screen  Completed    Hepatitis A vaccine  Aged Out    Hib vaccine  Aged Out    Meningococcal (ACWY) vaccine  Aged Out    Pneumococcal 0-64 years Vaccine  Aged Out     Recommendations for Biomeasure Due: see orders and patient instructions/AVS.    Return if symptoms worsen or fail to improve, for labs.

## 2025-03-10 ENCOUNTER — OFFICE VISIT (OUTPATIENT)
Dept: INTERNAL MEDICINE CLINIC | Facility: CLINIC | Age: 35
End: 2025-03-10
Payer: COMMERCIAL

## 2025-03-10 VITALS
HEART RATE: 70 BPM | DIASTOLIC BLOOD PRESSURE: 84 MMHG | HEIGHT: 62 IN | SYSTOLIC BLOOD PRESSURE: 116 MMHG | OXYGEN SATURATION: 99 % | BODY MASS INDEX: 21.71 KG/M2 | WEIGHT: 118 LBS

## 2025-03-10 DIAGNOSIS — D50.0 IRON DEFICIENCY ANEMIA DUE TO CHRONIC BLOOD LOSS: ICD-10-CM

## 2025-03-10 DIAGNOSIS — Z00.00 ANNUAL PHYSICAL EXAM: Primary | ICD-10-CM

## 2025-03-10 PROCEDURE — 99395 PREV VISIT EST AGE 18-39: CPT | Performed by: INTERNAL MEDICINE

## 2025-03-10 RX ORDER — CETIRIZINE HYDROCHLORIDE 10 MG/1
10 TABLET ORAL DAILY
COMMUNITY

## 2025-03-10 SDOH — ECONOMIC STABILITY: FOOD INSECURITY: WITHIN THE PAST 12 MONTHS, THE FOOD YOU BOUGHT JUST DIDN'T LAST AND YOU DIDN'T HAVE MONEY TO GET MORE.: NEVER TRUE

## 2025-03-10 SDOH — ECONOMIC STABILITY: FOOD INSECURITY: WITHIN THE PAST 12 MONTHS, YOU WORRIED THAT YOUR FOOD WOULD RUN OUT BEFORE YOU GOT MONEY TO BUY MORE.: NEVER TRUE

## 2025-03-10 ASSESSMENT — ENCOUNTER SYMPTOMS
CONSTIPATION: 0
ABDOMINAL PAIN: 0
CHEST TIGHTNESS: 0
COUGH: 0
SHORTNESS OF BREATH: 0
ABDOMINAL DISTENTION: 0

## 2025-03-10 ASSESSMENT — PATIENT HEALTH QUESTIONNAIRE - PHQ9
2. FEELING DOWN, DEPRESSED OR HOPELESS: NOT AT ALL
SUM OF ALL RESPONSES TO PHQ QUESTIONS 1-9: 0
1. LITTLE INTEREST OR PLEASURE IN DOING THINGS: NOT AT ALL

## 2025-03-10 NOTE — PROGRESS NOTES
Chief Complaint   Patient presents with    Annual Exam     Pap smear.        Jennifer Jeffries is a 35 y.o. 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  She is currently exercising, trying to maintain a healthy balanced diet,  She reports seeing another provider last year due to changes in her insurance,    She also reports episodes of acid reflux few months ago, which resolved with dietary changes, and Pepcid.  She is no longer taking any medication    Wt Readings from Last 3 Encounters:   03/10/25 53.5 kg (118 lb)   03/08/23 53.5 kg (118 lb)   01/24/23 52.6 kg (116 lb)         Assessment  And plan    1. Annual physical exam  -     CBC with Auto Differential; Future  -     Comprehensive Metabolic Panel; Future  -     Lipid Panel; Future  -     TSH reflex to FT4; Future  -     Iron and TIBC; Future  -     Ferritin; Future  2. Iron deficiency anemia due to chronic blood loss  -     Iron and TIBC; Future  -     Ferritin; Future      Discussed Primary prevention aims to avert the development of disease including Immunizations, life style modifications (smoking and alcohol  cessation, promoting physical activity: 150 minutes per week of moderate to high intensity activity , diet changes ), Also discussed about Secondary prevention and in how it focuses on early detection and treatment of asymptomatic disease. Screening for cancer, hearing ,dental or vision impairment, and how failure to do so may result in disease and even dead     Will continue to monitor iron deficiency, we will update her labs, continue iron every other day      Return in

## 2025-07-21 ENCOUNTER — OFFICE VISIT (OUTPATIENT)
Dept: INTERNAL MEDICINE CLINIC | Facility: CLINIC | Age: 35
End: 2025-07-21
Payer: COMMERCIAL

## 2025-07-21 VITALS
HEIGHT: 62 IN | SYSTOLIC BLOOD PRESSURE: 102 MMHG | HEART RATE: 69 BPM | TEMPERATURE: 97.9 F | OXYGEN SATURATION: 98 % | WEIGHT: 125.2 LBS | DIASTOLIC BLOOD PRESSURE: 80 MMHG | BODY MASS INDEX: 23.04 KG/M2

## 2025-07-21 DIAGNOSIS — D50.0 IRON DEFICIENCY ANEMIA DUE TO CHRONIC BLOOD LOSS: Chronic | ICD-10-CM

## 2025-07-21 DIAGNOSIS — N94.6 DYSMENORRHEA: Primary | Chronic | ICD-10-CM

## 2025-07-21 DIAGNOSIS — N92.0 MENORRHAGIA WITH REGULAR CYCLE: ICD-10-CM

## 2025-07-21 DIAGNOSIS — D50.0 IRON DEFICIENCY ANEMIA DUE TO CHRONIC BLOOD LOSS: ICD-10-CM

## 2025-07-21 DIAGNOSIS — Z00.00 ANNUAL PHYSICAL EXAM: ICD-10-CM

## 2025-07-21 LAB
ALBUMIN SERPL-MCNC: 3.8 G/DL (ref 3.5–5)
ALBUMIN/GLOB SERPL: 1 (ref 1–1.9)
ALP SERPL-CCNC: 57 U/L (ref 35–104)
ALT SERPL-CCNC: 17 U/L (ref 8–45)
ANION GAP SERPL CALC-SCNC: 10 MMOL/L (ref 7–16)
AST SERPL-CCNC: 22 U/L (ref 15–37)
BASOPHILS # BLD: 0.05 K/UL (ref 0–0.2)
BASOPHILS NFR BLD: 1.1 % (ref 0–2)
BILIRUB SERPL-MCNC: 0.5 MG/DL (ref 0–1.2)
BUN SERPL-MCNC: 11 MG/DL (ref 6–23)
CALCIUM SERPL-MCNC: 10.1 MG/DL (ref 8.8–10.2)
CHLORIDE SERPL-SCNC: 103 MMOL/L (ref 98–107)
CHOLEST SERPL-MCNC: 196 MG/DL (ref 0–200)
CO2 SERPL-SCNC: 27 MMOL/L (ref 20–29)
CREAT SERPL-MCNC: 0.89 MG/DL (ref 0.6–1.1)
DIFFERENTIAL METHOD BLD: ABNORMAL
EOSINOPHIL # BLD: 0.17 K/UL (ref 0–0.8)
EOSINOPHIL NFR BLD: 3.6 % (ref 0.5–7.8)
ERYTHROCYTE [DISTWIDTH] IN BLOOD BY AUTOMATED COUNT: 14.6 % (ref 11.9–14.6)
FERRITIN SERPL-MCNC: 63 NG/ML (ref 8–388)
GLOBULIN SER CALC-MCNC: 3.8 G/DL (ref 2.3–3.5)
GLUCOSE SERPL-MCNC: 100 MG/DL (ref 70–99)
HCT VFR BLD AUTO: 45.8 % (ref 35.8–46.3)
HDLC SERPL-MCNC: 60 MG/DL (ref 40–60)
HDLC SERPL: 3.3 (ref 0–5)
HGB BLD-MCNC: 14.7 G/DL (ref 11.7–15.4)
IMM GRANULOCYTES # BLD AUTO: 0.02 K/UL (ref 0–0.5)
IMM GRANULOCYTES NFR BLD AUTO: 0.4 % (ref 0–5)
IRON SATN MFR SERPL: 17 % (ref 20–50)
IRON SERPL-MCNC: 60 UG/DL (ref 35–100)
LDLC SERPL CALC-MCNC: 104 MG/DL (ref 0–100)
LYMPHOCYTES # BLD: 1.58 K/UL (ref 0.5–4.6)
LYMPHOCYTES NFR BLD: 33.9 % (ref 13–44)
MCH RBC QN AUTO: 29.9 PG (ref 26.1–32.9)
MCHC RBC AUTO-ENTMCNC: 32.1 G/DL (ref 31.4–35)
MCV RBC AUTO: 93.1 FL (ref 82–102)
MONOCYTES # BLD: 0.35 K/UL (ref 0.1–1.3)
MONOCYTES NFR BLD: 7.5 % (ref 4–12)
NEUTS SEG # BLD: 2.49 K/UL (ref 1.7–8.2)
NEUTS SEG NFR BLD: 53.5 % (ref 43–78)
NRBC # BLD: 0 K/UL (ref 0–0.2)
PLATELET # BLD AUTO: 328 K/UL (ref 150–450)
PMV BLD AUTO: 9.3 FL (ref 9.4–12.3)
POTASSIUM SERPL-SCNC: 4.9 MMOL/L (ref 3.5–5.1)
PROT SERPL-MCNC: 7.6 G/DL (ref 6.3–8.2)
RBC # BLD AUTO: 4.92 M/UL (ref 4.05–5.2)
SODIUM SERPL-SCNC: 140 MMOL/L (ref 136–145)
TIBC SERPL-MCNC: 350 UG/DL (ref 240–450)
TRIGL SERPL-MCNC: 161 MG/DL (ref 0–150)
TSH W FREE THYROID IF ABNORMAL: 1.47 UIU/ML (ref 0.27–4.2)
UIBC SERPL-MCNC: 290 UG/DL (ref 112–347)
VLDLC SERPL CALC-MCNC: 32 MG/DL (ref 6–23)
WBC # BLD AUTO: 4.7 K/UL (ref 4.3–11.1)

## 2025-07-21 PROCEDURE — 99214 OFFICE O/P EST MOD 30 MIN: CPT | Performed by: NURSE PRACTITIONER

## 2025-07-21 NOTE — PROGRESS NOTES
Jennifer Jeffries (: 1990)     History of Present Illness  The patient presents for evaluation of menstrual cramps.  She is an established patient of Dr. OCTAVIO Almeida.    She has been experiencing menstrual cramps for the past 3 years, which she describes as a sensation of intense pressure. The pain is severe enough to require medication and lasts for a day. She finds it uncomfortable to use tampons during her period, even though she had previously used them without issue. Her menstrual cycle is regular, with each period lasting 6 to 7 days. She is currently menstruating, with her current cycle starting on 2025. She experiences heavy bleeding on the second day of her period, necessitating the use of one pad per hour for about 24 hours. She has not undergone an ultrasound with Dr. Juarez. She reports no history of fibroids or ovarian cysts. She also reports no bladder symptoms. She has tried contraceptives for 2 weeks but discontinued due to discomfort. She takes Aleve for the cramps, which provides relief.    She has a long-standing history of iron deficiency anemia, which she believes may be related to her heavy periods. She takes iron tablets.    GYNECOLOGICAL HISTORY:  - Last Menstrual Period: 2025  - Cycle Length: 6-7 days  - Frequency and Flow: Heavy bleeding on the second day, requiring one pad per hour for about 24 hours  - Menstrual Pain: Present, described as intense pressure, relieved by Aleve    FAMILY HISTORY  Her mother had skin cancer.  No family hx/o breast, ovarian or colon cancer.    Chief Complaint   Patient presents with    Menstrual Cramps     Severe pain for the past 2 months     Patient Active Problem List   Diagnosis    Iron deficiency anemia due to chronic blood loss    Angioneurotic edema    Idiopathic urticaria      Reviewed and updated this visit by provider:  Tobacco  Allergies  Meds  Problems  Med Hx  Surg Hx  Fam Hx

## 2025-07-28 ENCOUNTER — TELEPHONE (OUTPATIENT)
Dept: OBGYN CLINIC | Age: 35
End: 2025-07-28